# Patient Record
Sex: FEMALE | Race: WHITE
[De-identification: names, ages, dates, MRNs, and addresses within clinical notes are randomized per-mention and may not be internally consistent; named-entity substitution may affect disease eponyms.]

---

## 2018-02-05 ENCOUNTER — HOSPITAL ENCOUNTER (EMERGENCY)
Dept: HOSPITAL 4 - SED | Age: 43
Discharge: HOME | End: 2018-02-05
Payer: MEDICAID

## 2018-02-05 VITALS — HEIGHT: 65 IN | BODY MASS INDEX: 36.65 KG/M2 | WEIGHT: 220 LBS

## 2018-02-05 VITALS — SYSTOLIC BLOOD PRESSURE: 161 MMHG

## 2018-02-05 DIAGNOSIS — Z88.2: ICD-10-CM

## 2018-02-05 DIAGNOSIS — J06.9: Primary | ICD-10-CM

## 2018-02-05 NOTE — NUR
Patient given written and verbal discharge instructions and verbalizes 
understanding.  ER MD discussed with patient the results and treatment 
provided. Patient in stable condition. ID arm band removed. Rx of tylenol, 
motrin given. Patient educated on pain management and to follow up with PMD. 
Pain Scale 0/10. Opportunity for questions provided and answered.

## 2018-02-14 ENCOUNTER — HOSPITAL ENCOUNTER (EMERGENCY)
Dept: HOSPITAL 4 - SED | Age: 43
Discharge: HOME | End: 2018-02-14
Payer: MEDICAID

## 2018-02-14 VITALS — HEIGHT: 65 IN | BODY MASS INDEX: 36.65 KG/M2 | SYSTOLIC BLOOD PRESSURE: 151 MMHG | WEIGHT: 220 LBS

## 2018-02-14 VITALS — SYSTOLIC BLOOD PRESSURE: 144 MMHG

## 2018-02-14 DIAGNOSIS — R03.0: ICD-10-CM

## 2018-02-14 DIAGNOSIS — J01.10: Primary | ICD-10-CM

## 2018-02-14 NOTE — NUR
Patient given written and verbal discharge instructions and verbalizes 
understanding.  ER MD discussed with patient the results and treatment 
provided. Patient in stable condition. ID arm band removed. 

Rx of ALBUTEROL, TESSALON PERLES, PREDNISONE, AUGMENTIN, PROMETHAZINE given. 
Patient educated on pain management and to follow up with PMD. Pain Scale 0/10.

Opportunity for questions provided and answered.

## 2018-02-14 NOTE — NUR
PT AAOX4, ABLE TO VERBALIZE NEEDS. PT STATES FOR THE PAST 10 DAYS SHE HAS HAD 
"FLU LIKE SYMPTOMS". PT STATES COUGH STARTED 2 DAYS AGO AND SHE HAS BEEN 
COUGHING UP YELLOW/ORANGE MUCUS. PT STATES SHE HAS CHILLS/BODYACHES AND HAS 
BEEN TAKING TYLENOL WITH SOME RELIEF. PT DENIES NAUSEA, VOMITING, SHORTNESS OF 
BREATH, CHEST PAIN. PT STATES SHE HAS DIARRHEA NOW BUT STATES SHE NORMALLY HAS 
DIARRHEA WHEN SHE IS MENSTRUATING, WHICH IS AT THIS TIME. PT STATES SHE HAS 
BEEN ABLE TO EAT/DRINK AS USUAL. NO OTHER COMPLAINTS/INJURIES PER PT OR NOTED.

## 2019-06-23 ENCOUNTER — HOSPITAL ENCOUNTER (EMERGENCY)
Dept: HOSPITAL 4 - SED | Age: 44
Discharge: HOME | End: 2019-06-23
Payer: MEDICAID

## 2019-06-23 VITALS — SYSTOLIC BLOOD PRESSURE: 152 MMHG

## 2019-06-23 VITALS — HEIGHT: 65 IN | WEIGHT: 170 LBS | BODY MASS INDEX: 28.32 KG/M2

## 2019-06-23 VITALS — SYSTOLIC BLOOD PRESSURE: 157 MMHG

## 2019-06-23 DIAGNOSIS — D21.9: ICD-10-CM

## 2019-06-23 DIAGNOSIS — N83.201: ICD-10-CM

## 2019-06-23 DIAGNOSIS — R03.0: ICD-10-CM

## 2019-06-23 DIAGNOSIS — N83.202: Primary | ICD-10-CM

## 2019-06-23 DIAGNOSIS — N93.8: ICD-10-CM

## 2019-06-23 DIAGNOSIS — Z88.2: ICD-10-CM

## 2019-06-23 LAB
APPEARANCE UR: (no result)
BACTERIA URNS QL MICRO: (no result) /HPF
BASOPHILS NFR BLD MANUAL: 0 % (ref 0–2)
BILIRUB UR QL STRIP: NEGATIVE
COLOR UR: YELLOW
EOSINOPHIL NFR BLD MANUAL: 0 % (ref 0–7)
ERYTHROCYTE [DISTWIDTH] IN BLOOD BY AUTOMATED COUNT: 13.8 % (ref 9–15)
GLUCOSE UR STRIP-MCNC: NEGATIVE MG/DL
HCT VFR BLD AUTO: 37.8 % (ref 36–48)
HGB BLD-MCNC: 12.5 G/DL (ref 12–16)
HGB UR QL STRIP: (no result)
INR PPP: 1 (ref 0.8–1.2)
KETONES UR STRIP-MCNC: NEGATIVE MG/DL
LEUKOCYTE ESTERASE UR QL STRIP: NEGATIVE
LYMPHOCYTES NFR BLD MANUAL: 49 % (ref 20–46)
MCH RBC QN AUTO: 28 PG (ref 27–31)
MCHC RBC AUTO-ENTMCNC: 33 % (ref 32–36)
MCV RBC AUTO: 86 FL (ref 79–98)
MONOCYTES # BLD MANUAL: 15 % (ref 0–11)
MUCOUS THREADS URNS QL MICRO: (no result) /LPF
NITRITE UR QL STRIP: NEGATIVE
PH UR STRIP: 6 [PH] (ref 5–8)
PLATELET # BLD AUTO: 70 K/UL (ref 130–430)
PROT UR QL STRIP: (no result)
PROTHROMBIN TIME: 9.9 SECS (ref 9.5–12.5)
RBC # BLD AUTO: 4.4 MIL/UL (ref 4.2–6.2)
RBC #/AREA URNS HPF: (no result) /HPF (ref 0–3)
SP GR UR STRIP: 1.02 (ref 1–1.03)
UROBILINOGEN UR STRIP-MCNC: 0.2 MG/DL (ref 0.2–1)
WBC # BLD AUTO: 2.4 K/UL (ref 4.8–10.8)
WBC #/AREA URNS HPF: (no result) /HPF (ref 0–3)